# Patient Record
Sex: FEMALE | Race: WHITE | NOT HISPANIC OR LATINO | Employment: FULL TIME | URBAN - METROPOLITAN AREA
[De-identification: names, ages, dates, MRNs, and addresses within clinical notes are randomized per-mention and may not be internally consistent; named-entity substitution may affect disease eponyms.]

---

## 2019-09-09 ENCOUNTER — TRANSFERRED RECORDS (OUTPATIENT)
Dept: HEALTH INFORMATION MANAGEMENT | Facility: CLINIC | Age: 54
End: 2019-09-09

## 2022-09-11 ENCOUNTER — HOSPITAL ENCOUNTER (EMERGENCY)
Facility: CLINIC | Age: 57
Discharge: LEFT BEFORE DISCHARGE | End: 2022-09-11
Payer: COMMERCIAL

## 2022-09-11 VITALS
BODY MASS INDEX: 35.55 KG/M2 | HEIGHT: 69 IN | WEIGHT: 240 LBS | SYSTOLIC BLOOD PRESSURE: 147 MMHG | DIASTOLIC BLOOD PRESSURE: 83 MMHG | TEMPERATURE: 98.6 F | HEART RATE: 94 BPM | OXYGEN SATURATION: 97 % | RESPIRATION RATE: 18 BRPM

## 2022-09-11 NOTE — ED TRIAGE NOTES
Pt presents to the ED with c/o left sided facial numbness and left sided facial droop that started around 0900 and only lasted 30 seconds. Pt states all symptoms have resolved. Denies any sx at time of triage.